# Patient Record
(demographics unavailable — no encounter records)

---

## 2024-12-16 NOTE — HISTORY OF PRESENT ILLNESS
[Palpitations] : palpitations [SOB] : no dyspnea [Syncope] : no syncope [Dizziness] : no dizziness [Chest Pain] : no chest pain or discomfort [Shoulder Pain] : no shoulder pain [Pain at Site] : no pain at device site [Erythema at Site] : no erythema at device site [Swelling at Site] : no swelling at device site [de-identified] : uterine fibroids went to the ED for evaluation of palpitations 11/10/2022. Patient reports she checked her BP machine and her heart rate was 211 bpm. She reports that the feeling of palpitations remained more than 30 minutes.  Patient reports experiencing similar symptoms in 2021 and was placed in observation unit and discharged with cardiology follow-up, reports never followed up outpatient with cardiology.    10/24/2023: continues to have intermittent palpitations. ILR shows multiple episodes of sinus Tc. 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  12/16/2024: Today the patient is feeling generally well with no acute complaints. Denies CP, SOB, palpitations, dizziness, syncope. She states she has been feeling intermittent pain to her L-breast - has not f/u with OB/GYN.  No medications.

## 2024-12-16 NOTE — DISCUSSION/SUMMARY
[FreeTextEntry1] : Ms. Ericka Freire is a pleasant 35 year-old woman with uterine fibroids went to the ED for evaluation of palpitations 11/10/2022. Patient reports she checked her BP machine and her heart rate was 211 bpm. She reports that the feeling of palpitations remained more than 30 minutes.  Patient reports experiencing similar symptoms in 2021. She underwent on 11/10/2022 ILR implant.  Patient underwent fibroid removal sx March 2024 - pt states she has been feeling less palps.  - Device interrogated and reprogrammed as described in procedure. Device function normal. Tachy events noted. See Device Printout. - No events since July 2024.  Patient is not interested in medications at this time.  Recommend patient to f/u with PCP.  Recommend to f/u with OB/GYN for breast pain   I discussed with patient plan of care in great details. I answered all her questions to her satisfaction. Patient was pleased with the visit.  Patient will follow with me in 6 months' time. Please do not hesitate to contact me at [phone] if you have any further questions regarding this patient care.

## 2024-12-16 NOTE — PROCEDURE
[No] : not [NSR] : normal sinus rhythm [See Device Printout] : See device printout [Counters Reset] : the counters were reset [Sensing Amplitude ___mv] : sensing amplitude was [unfilled] mv [de-identified] : 83 bpm [de-identified] : LINQ2 [de-identified] : SUSHMA [de-identified] : [id number] [de-identified] : 11/10/2022 [de-identified] : Good [de-identified] : Multiple Tachy episodes since 11/27/2023. Last event July 17 - sinus tachycardia Normal device function.  Transmitting on [employer].

## 2024-12-16 NOTE — REVIEW OF SYSTEMS
[SOB] : shortness of breath [Palpitations] : palpitations [Negative] : Neurological [Dyspnea on exertion] : not dyspnea during exertion [Chest Discomfort] : no chest discomfort [Syncope] : no syncope

## 2024-12-16 NOTE — PHYSICAL EXAM
[General Appearance - Well Developed] : well developed [Normal Appearance] : normal appearance [Well Groomed] : well groomed [General Appearance - Well Nourished] : well nourished [No Deformities] : no deformities [General Appearance - In No Acute Distress] : no acute distress [Heart Rate And Rhythm] : heart rate and rhythm were normal [Heart Sounds] : normal S1 and S2 [Murmurs] : no murmurs present [] : no respiratory distress [Respiration, Rhythm And Depth] : normal respiratory rhythm and effort [Exaggerated Use Of Accessory Muscles For Inspiration] : no accessory muscle use [Clean] : clean [Dry] : dry [Well-Healed] : well-healed [Abdomen Soft] : soft [Nail Clubbing] : no clubbing of the fingernails [Cyanosis, Localized] : no localized cyanosis [Normal Conjunctiva] : the conjunctiva exhibited no abnormalities [Eyelids - No Xanthelasma] : the eyelids demonstrated no xanthelasmas [Normal Oral Mucosa] : normal oral mucosa [No Oral Pallor] : no oral pallor [No Oral Cyanosis] : no oral cyanosis [Abnormal Walk] : normal gait [Gait - Sufficient For Exercise Testing] : the gait was sufficient for exercise testing [Normal Jugular Venous A Waves Present] : normal jugular venous A waves present [Normal Jugular Venous V Waves Present] : normal jugular venous V waves present [No Jugular Venous Spicer A Waves] : no jugular venous spicer A waves [FreeTextEntry1] : Left parasternal

## 2024-12-16 NOTE — CARDIOLOGY SUMMARY
[de-identified] : 12/16/2024: high artifact - SR @ 74bpm 10/24/2023: sinus rhythm at 82 bpm, no significantST/T abnormalities 12/16/2022: NSR 73 bpm [de-identified] : 02/02/2023: no evidence of ischemia and no stress induced arrhythmias\par  Manuel protocol, exercised for 6:53 minutes and reached 88% of her PMHR. 8.6 METS [de-identified] : 11/27/2021:\par   1. LV Ejection Fraction by Morrison's Method with a biplane EF of 66 %.\par   2. No evidence of mitral valve regurgitation.\par   3. Mild tricuspid regurgitation. [de-identified] : 11/10/2022 ILR implant.